# Patient Record
Sex: MALE | Race: WHITE | NOT HISPANIC OR LATINO | Employment: FULL TIME | URBAN - METROPOLITAN AREA
[De-identification: names, ages, dates, MRNs, and addresses within clinical notes are randomized per-mention and may not be internally consistent; named-entity substitution may affect disease eponyms.]

---

## 2017-01-07 ENCOUNTER — APPOINTMENT (OUTPATIENT)
Dept: URGENT CARE | Age: 57
End: 2017-01-07
Payer: COMMERCIAL

## 2017-01-07 PROCEDURE — G0382 LEV 3 HOSP TYPE B ED VISIT: HCPCS | Performed by: FAMILY MEDICINE

## 2017-07-07 ENCOUNTER — OFFICE VISIT (OUTPATIENT)
Dept: URGENT CARE | Age: 57
End: 2017-07-07
Payer: COMMERCIAL

## 2017-07-07 ENCOUNTER — TRANSCRIBE ORDERS (OUTPATIENT)
Dept: URGENT CARE | Age: 57
End: 2017-07-07

## 2017-07-07 ENCOUNTER — APPOINTMENT (OUTPATIENT)
Dept: RADIOLOGY | Age: 57
End: 2017-07-07
Attending: PHYSICIAN ASSISTANT
Payer: COMMERCIAL

## 2017-07-07 DIAGNOSIS — S49.90XA INJURY OF SHOULDER OR UPPER ARM: ICD-10-CM

## 2017-07-07 DIAGNOSIS — S89.90XA INJURY OF LOWER LEG: ICD-10-CM

## 2017-07-07 PROCEDURE — 73590 X-RAY EXAM OF LOWER LEG: CPT

## 2017-07-07 PROCEDURE — 73030 X-RAY EXAM OF SHOULDER: CPT

## 2017-07-07 PROCEDURE — 90715 TDAP VACCINE 7 YRS/> IM: CPT

## 2018-05-30 ENCOUNTER — OFFICE VISIT (OUTPATIENT)
Dept: URGENT CARE | Age: 58
End: 2018-05-30
Payer: COMMERCIAL

## 2018-05-30 VITALS
WEIGHT: 266.2 LBS | HEART RATE: 54 BPM | SYSTOLIC BLOOD PRESSURE: 139 MMHG | TEMPERATURE: 98.2 F | RESPIRATION RATE: 14 BRPM | HEIGHT: 72 IN | BODY MASS INDEX: 36.06 KG/M2 | OXYGEN SATURATION: 98 % | DIASTOLIC BLOOD PRESSURE: 72 MMHG

## 2018-05-30 DIAGNOSIS — H69.83 EUSTACHIAN TUBE DYSFUNCTION, BILATERAL: Primary | ICD-10-CM

## 2018-05-30 DIAGNOSIS — R42 DIZZINESS AND GIDDINESS: ICD-10-CM

## 2018-05-30 PROCEDURE — 99213 OFFICE O/P EST LOW 20 MIN: CPT | Performed by: FAMILY MEDICINE

## 2018-05-30 NOTE — PROGRESS NOTES
[address]        NAME: Tiara Stoner is a 60 y o  male  : 1960    MRN: [id number]  DATE: May 31, 2018  TIME: 2:51 PM    Assessment and Plan   Eustachian tube dysfunction, bilateral [H69 [id number]]  1  Eustachian tube dysfunction, bilateral     2  Dizziness and giddiness           Patient Instructions     Patient Instructions   Patient would like to go to [facility] for full evaluation of dizziness  His wife will drive him  Chief Complaint     Chief Complaint   Patient presents with    Earache     x 2 days right < left side- also c/o vertigo         History of Present Illness   Tiara Stoner presents to the clinic c/o    This is a 60year old male here today with complaints of ear pain  Symptoms started yesterday  He also states he has had 2 episodes of dizziness with movements  First time was this AM when he got out of bed  Last time was when he was at working bending  He has ear pain on right side greater than left  He denies chest pain or SOB  No headaches  He noticed ear pain yesterday  He had MRI in 2018 due to slurred speech  He is followed by cardiology  Review of Systems   Review of Systems   Constitutional: Negative for activity change  HENT: Positive for ear pain  Negative for congestion and sore throat  Respiratory: Negative  Skin: Negative  Neurological: Positive for dizziness  Negative for speech difficulty, weakness, light-headedness and headaches  Psychiatric/Behavioral: Negative            Current Medications     Long-Term Prescriptions   Medication Sig Dispense Refill    aspirin 81 MG tablet Take 81 mg by mouth         Current Allergies     Allergies as of 2018 - Reviewed 2018   Allergen Reaction Noted    Other Swelling 12/10/2014            The following portions of the patient's history were reviewed and updated as appropriate: allergies, current medications, past family history, past medical history, past social history, past surgical history and problem list     Objective   /72   Pulse (!) 54   Temp 98 2 °F (36 8 °C) (Temporal)   Resp 14   Ht 6' (1 829 m)   Wt 121 kg (266 lb 3 2 oz)   SpO2 98%   BMI 36 10 kg/m²        Physical Exam     Physical Exam   Constitutional: He is oriented to person, place, and time  He appears well-developed and well-nourished  HENT:   Bilateral serous fluid in ears  Neck: Normal range of motion  Neck supple  Cardiovascular: Normal rate, regular rhythm and normal heart sounds  Pulmonary/Chest: Effort normal and breath sounds normal    Neurological: He is alert and oriented to person, place, and time  Skin: Skin is warm  Psychiatric: He has a normal mood and affect  His behavior is normal    Nursing note and vitals reviewed

## 2018-05-30 NOTE — PATIENT INSTRUCTIONS
Patient would like to go to [facility] for full evaluation of dizziness  His wife will drive him

## 2022-09-08 ENCOUNTER — OFFICE VISIT (OUTPATIENT)
Dept: PODIATRY | Facility: CLINIC | Age: 62
End: 2022-09-08
Payer: COMMERCIAL

## 2022-09-08 VITALS — SYSTOLIC BLOOD PRESSURE: 148 MMHG | HEART RATE: 67 BPM | DIASTOLIC BLOOD PRESSURE: 73 MMHG

## 2022-09-08 DIAGNOSIS — L85.2 KERATODERMA PUNCTATA: Primary | ICD-10-CM

## 2022-09-08 DIAGNOSIS — M79.672 LEFT FOOT PAIN: ICD-10-CM

## 2022-09-08 PROCEDURE — 99202 OFFICE O/P NEW SF 15 MIN: CPT | Performed by: PODIATRIST

## 2022-09-08 NOTE — PROGRESS NOTES
Assessment/Plan:    Explained the patient that he is dealing with a porokeratosis of the left foot  Treatment consisted of lesion trimming  Patient cannot see this lesion easily and therefore periodic palliative care advised over salicylic acid  Reappoint p r n  No problem-specific Assessment & Plan notes found for this encounter  Diagnoses and all orders for this visit:    Keratoderma punctata    Left foot pain    Other orders  -     MULTIPLE VITAMIN PO; Take by mouth          Subjective:      Patient ID: Darian Beebe is a 60 y o  male  HPI     Patient, a 79-year-old male presents with a painful lesion on the bottom of the left foot  Lesion has been present off and on for approximately 3 years  Patient is concerned that he may have a wart  Patient notes that he used to wear a [place] brace on the left foot but no longer  He has restricted motion in the left foot especially in the frontal plane  The following portions of the patient's history were reviewed and updated as appropriate: allergies, current medications, past family history, past medical history, past social history, past surgical history and problem list     Review of Systems   Gastrointestinal: Negative  Musculoskeletal: Positive for gait problem  Psychiatric/Behavioral: Negative  Objective:      /73   Pulse 67          Physical Exam  Constitutional:       Appearance: Normal appearance  Cardiovascular:      Pulses: Normal pulses  Musculoskeletal:         General: Deformity present  Comments: Minimal frontal plane motion available left foot  Sagittal plane motion left foot within normal limits  Skin:     Findings: Lesion present  Comments: Punctate hyperkeratotic lesion distal to left 4th metatarsal head  No evidence of verruca

## 2025-06-26 ENCOUNTER — APPOINTMENT (OUTPATIENT)
Dept: NON INVASIVE DIAGNOSTICS | Facility: HOSPITAL | Age: 65
End: 2025-06-26
Payer: MEDICARE

## 2025-06-26 ENCOUNTER — APPOINTMENT (EMERGENCY)
Dept: RADIOLOGY | Facility: HOSPITAL | Age: 65
End: 2025-06-26
Payer: MEDICARE

## 2025-06-26 ENCOUNTER — HOSPITAL ENCOUNTER (OUTPATIENT)
Facility: HOSPITAL | Age: 65
Setting detail: OBSERVATION
Discharge: HOME | End: 2025-06-27
Attending: EMERGENCY MEDICINE | Admitting: FAMILY MEDICINE
Payer: MEDICARE

## 2025-06-26 DIAGNOSIS — R07.9 CHEST PAIN: ICD-10-CM

## 2025-06-26 DIAGNOSIS — R00.1 BRADYCARDIA: Primary | ICD-10-CM

## 2025-06-26 DIAGNOSIS — R42 LIGHTHEADEDNESS: ICD-10-CM

## 2025-06-26 PROBLEM — E78.5 HYPERLIPIDEMIA: Status: ACTIVE | Noted: 2025-06-26

## 2025-06-26 PROBLEM — E66.9 OBESITY: Status: ACTIVE | Noted: 2025-06-26

## 2025-06-26 PROBLEM — G47.33 OBSTRUCTIVE SLEEP APNEA: Status: ACTIVE | Noted: 2025-06-26

## 2025-06-26 PROBLEM — Z72.0 TOBACCO ABUSE: Status: ACTIVE | Noted: 2025-06-26

## 2025-06-26 LAB
2HR DELTA HS TROPONIN: -2 NG/L
4HR DELTA HS TROPONIN: -1 NG/L
ALBUMIN SERPL BCG-MCNC: 3.7 G/DL (ref 3.5–5)
ALP SERPL-CCNC: 75 U/L (ref 34–104)
ALT SERPL W P-5'-P-CCNC: 11 U/L (ref 7–52)
ANION GAP SERPL CALCULATED.3IONS-SCNC: 5 MMOL/L (ref 4–13)
AORTIC ROOT: 3.7 CM
AST SERPL W P-5'-P-CCNC: 12 U/L (ref 13–39)
ATRIAL RATE: 43 BPM
ATRIAL RATE: 48 BPM
AV LVOT MEAN GRADIENT: 2 MMHG
AV LVOT PEAK GRADIENT: 4 MMHG
AV MEAN PRESS GRAD SYS DOP V1V2: 4.6 MMHG
AV ORIFICE AREA US: 2.09 CM2
AV PEAK GRADIENT: 11.2 MMHG
AV REGURGITATION PRESSURE HALF TIME: 903 MS
AV VELOCITY RATIO: 0.6
AV VMAX SYS DOP: 1.7 M/S
BASOPHILS # BLD AUTO: 0.06 THOUSANDS/ÂΜL (ref 0–0.1)
BASOPHILS NFR BLD AUTO: 1 % (ref 0–1)
BILIRUB SERPL-MCNC: 0.34 MG/DL (ref 0.2–1)
BSA FOR ECHO PROCEDURE: 2.34 M2
BUN SERPL-MCNC: 19 MG/DL (ref 5–25)
CALCIUM SERPL-MCNC: 8.5 MG/DL (ref 8.4–10.2)
CARDIAC TROPONIN I PNL SERPL HS: 5 NG/L (ref ?–50)
CARDIAC TROPONIN I PNL SERPL HS: 6 NG/L (ref ?–50)
CARDIAC TROPONIN I PNL SERPL HS: 7 NG/L (ref ?–50)
CHLORIDE SERPL-SCNC: 109 MMOL/L (ref 96–108)
CO2 SERPL-SCNC: 25 MMOL/L (ref 21–32)
CREAT SERPL-MCNC: 0.95 MG/DL (ref 0.6–1.3)
DOP CALC AO VTI: 34.4 CM
DOP CALC LVOT AREA: 3.46 CM2
DOP CALC LVOT DIAMETER: 2.1 CM
DOP CALC LVOT PEAK VEL VTI: 20.78 CM
DOP CALC LVOT PEAK VEL: 1.02 M/S
DOP CALC LVOT STROKE VOLUME: 71.94 CM3
E WAVE DECELERATION TIME: 245 MS
E/A RATIO: 1.09
EOSINOPHIL # BLD AUTO: 0.29 THOUSAND/ÂΜL (ref 0–0.61)
EOSINOPHIL NFR BLD AUTO: 4 % (ref 0–6)
ERYTHROCYTE [DISTWIDTH] IN BLOOD BY AUTOMATED COUNT: 13.7 % (ref 11.6–15.1)
EST. AVERAGE GLUCOSE BLD GHB EST-MCNC: 105 MG/DL
FRACTIONAL SHORTENING: 22 (ref 28–44)
GFR SERPL CREATININE-BSD FRML MDRD: 83 ML/MIN/1.73SQ M
GLUCOSE SERPL-MCNC: 111 MG/DL (ref 65–140)
HBA1C MFR BLD: 5.3 %
HCT VFR BLD AUTO: 35.6 % (ref 36.5–49.3)
HGB BLD-MCNC: 12 G/DL (ref 12–17)
IMM GRANULOCYTES # BLD AUTO: 0.03 THOUSAND/UL (ref 0–0.2)
IMM GRANULOCYTES NFR BLD AUTO: 0 % (ref 0–2)
INTERVENTRICULAR SEPTUM IN DIASTOLE (PARASTERNAL SHORT AXIS VIEW): 1.3 CM
INTERVENTRICULAR SEPTUM: 1.3 CM (ref 0.6–1.1)
IVC: 12.5 MM
LAAS-AP2: 23.5 CM2
LAAS-AP4: 26.6 CM2
LEFT ATRIUM SIZE: 3.6 CM
LEFT ATRIUM VOLUME (MOD BIPLANE): 88 ML
LEFT ATRIUM VOLUME INDEX (MOD BIPLANE): 37.6 ML/M2
LEFT INTERNAL DIMENSION IN SYSTOLE: 4 CM (ref 2.1–4)
LEFT VENTRICULAR INTERNAL DIMENSION IN DIASTOLE: 5.1 CM (ref 3.5–6)
LEFT VENTRICULAR POSTERIOR WALL IN END DIASTOLE: 1.3 CM
LEFT VENTRICULAR STROKE VOLUME: 53 ML
LV EF US.2D.A4C+ESTIMATED: 56 %
LVSV (TEICH): 53 ML
LYMPHOCYTES # BLD AUTO: 1.99 THOUSANDS/ÂΜL (ref 0.6–4.47)
LYMPHOCYTES NFR BLD AUTO: 24 % (ref 14–44)
MCH RBC QN AUTO: 31.2 PG (ref 26.8–34.3)
MCHC RBC AUTO-ENTMCNC: 33.7 G/DL (ref 31.4–37.4)
MCV RBC AUTO: 93 FL (ref 82–98)
MONOCYTES # BLD AUTO: 0.62 THOUSAND/ÂΜL (ref 0.17–1.22)
MONOCYTES NFR BLD AUTO: 8 % (ref 4–12)
MV E'TISSUE VEL-LAT: 17 CM/S
MV E'TISSUE VEL-SEP: 11 CM/S
MV PEAK A VEL: 0.54 M/S
MV PEAK E VEL: 59 CM/S
MV STENOSIS PRESSURE HALF TIME: 71 MS
MV VALVE AREA P 1/2 METHOD: 3.1
NEUTROPHILS # BLD AUTO: 5.22 THOUSANDS/ÂΜL (ref 1.85–7.62)
NEUTS SEG NFR BLD AUTO: 63 % (ref 43–75)
NRBC BLD AUTO-RTO: 0 /100 WBCS
P AXIS: 47 DEGREES
P AXIS: 54 DEGREES
PLATELET # BLD AUTO: 237 THOUSANDS/UL (ref 149–390)
PMV BLD AUTO: 8.3 FL (ref 8.9–12.7)
POTASSIUM SERPL-SCNC: 4 MMOL/L (ref 3.5–5.3)
PR INTERVAL: 152 MS
PR INTERVAL: 192 MS
PROT SERPL-MCNC: 6.4 G/DL (ref 6.4–8.4)
QRS AXIS: 25 DEGREES
QRS AXIS: 25 DEGREES
QRSD INTERVAL: 110 MS
QRSD INTERVAL: 112 MS
QT INTERVAL: 462 MS
QT INTERVAL: 480 MS
QTC INTERVAL: 405 MS
QTC INTERVAL: 413 MS
RA PRESSURE ESTIMATED: 3 MMHG
RBC # BLD AUTO: 3.85 MILLION/UL (ref 3.88–5.62)
RIGHT ATRIAL 2D VOLUME: 56 ML
RIGHT ATRIUM AREA SYSTOLE A4C: 20.5 CM2
RIGHT VENTRICLE ID DIMENSION: 4.2 CM
SL CV AV DECELERATION TIME RETROGRADE: 3112 MS
SL CV AV PEAK GRADIENT RETROGRADE: 74 MMHG
SL CV LEFT ATRIUM LENGTH A2C: 5.7 CM
SL CV LV EF: 55
SL CV PED ECHO LEFT VENTRICLE DIASTOLIC VOLUME (MOD BIPLANE) 2D: 123 ML
SL CV PED ECHO LEFT VENTRICLE SYSTOLIC VOLUME (MOD BIPLANE) 2D: 70 ML
SODIUM SERPL-SCNC: 139 MMOL/L (ref 135–147)
T WAVE AXIS: 31 DEGREES
T WAVE AXIS: 47 DEGREES
TRICUSPID ANNULAR PLANE SYSTOLIC EXCURSION: 2 CM
TSH SERPL DL<=0.05 MIU/L-ACNC: 1.91 UIU/ML (ref 0.45–4.5)
VENTRICULAR RATE: 43 BPM
VENTRICULAR RATE: 48 BPM
WBC # BLD AUTO: 8.21 THOUSAND/UL (ref 4.31–10.16)

## 2025-06-26 PROCEDURE — 85025 COMPLETE CBC W/AUTO DIFF WBC: CPT

## 2025-06-26 PROCEDURE — 84484 ASSAY OF TROPONIN QUANT: CPT

## 2025-06-26 PROCEDURE — 83036 HEMOGLOBIN GLYCOSYLATED A1C: CPT | Performed by: FAMILY MEDICINE

## 2025-06-26 PROCEDURE — 80053 COMPREHEN METABOLIC PANEL: CPT

## 2025-06-26 PROCEDURE — 99285 EMERGENCY DEPT VISIT HI MDM: CPT | Performed by: PHYSICIAN ASSISTANT

## 2025-06-26 PROCEDURE — 99204 OFFICE O/P NEW MOD 45 MIN: CPT | Performed by: INTERNAL MEDICINE

## 2025-06-26 PROCEDURE — 84443 ASSAY THYROID STIM HORMONE: CPT

## 2025-06-26 PROCEDURE — 93306 TTE W/DOPPLER COMPLETE: CPT | Performed by: INTERNAL MEDICINE

## 2025-06-26 PROCEDURE — 93005 ELECTROCARDIOGRAM TRACING: CPT

## 2025-06-26 PROCEDURE — 71046 X-RAY EXAM CHEST 2 VIEWS: CPT

## 2025-06-26 PROCEDURE — 93010 ELECTROCARDIOGRAM REPORT: CPT | Performed by: INTERNAL MEDICINE

## 2025-06-26 PROCEDURE — 99285 EMERGENCY DEPT VISIT HI MDM: CPT

## 2025-06-26 PROCEDURE — 93306 TTE W/DOPPLER COMPLETE: CPT

## 2025-06-26 PROCEDURE — 99223 1ST HOSP IP/OBS HIGH 75: CPT | Performed by: FAMILY MEDICINE

## 2025-06-26 PROCEDURE — 36415 COLL VENOUS BLD VENIPUNCTURE: CPT

## 2025-06-26 RX ORDER — ENOXAPARIN SODIUM 100 MG/ML
40 INJECTION SUBCUTANEOUS DAILY
Status: DISCONTINUED | OUTPATIENT
Start: 2025-06-27 | End: 2025-06-27 | Stop reason: HOSPADM

## 2025-06-26 RX ORDER — ONDANSETRON 2 MG/ML
4 INJECTION INTRAMUSCULAR; INTRAVENOUS EVERY 6 HOURS PRN
Status: DISCONTINUED | OUTPATIENT
Start: 2025-06-26 | End: 2025-06-27 | Stop reason: HOSPADM

## 2025-06-26 RX ORDER — NITROGLYCERIN 0.4 MG/1
0.4 TABLET SUBLINGUAL
Status: DISCONTINUED | OUTPATIENT
Start: 2025-06-26 | End: 2025-06-27 | Stop reason: HOSPADM

## 2025-06-26 RX ORDER — ACETAMINOPHEN 325 MG/1
650 TABLET ORAL EVERY 4 HOURS PRN
Status: DISCONTINUED | OUTPATIENT
Start: 2025-06-26 | End: 2025-06-27 | Stop reason: HOSPADM

## 2025-06-26 RX ORDER — ASPIRIN 81 MG/1
81 TABLET ORAL DAILY
Status: DISCONTINUED | OUTPATIENT
Start: 2025-06-27 | End: 2025-06-27 | Stop reason: HOSPADM

## 2025-06-26 NOTE — H&P
H&P - Hospitalist   Name: Ector Crouch 65 y.o. male I MRN: [id number]  Unit/Bed#: ED 08 I Date of Admission: 6/26/2025   Date of Service: 6/26/2025 I Hospital Day: 0     Assessment & Plan  Chest pain  Patient came to the hospital with acute onset of chest pain today morning.  Patient reported that he noticed the pain when he woke up.    Chest pain was 6 located on his left side with some radiation to the left upper extremities.  Lasted for about 45 minutes and currently there is no pain.  Gives associated dizziness and also nausea  Patient follows up with cardiologist in her up and recent workup showed increased calcium score  Previous stress echocardiogram from 10/23 reviewed.  Ejection fraction 61%.  Mild left ventricular dilatation.  Normal LV diastolic function.  Small LV diverticulum at the apex no significant valvular abnormalities.  At peak stress the left ventricular ejection fraction increased normally and no new wall motion abnormalities seen  Troponin negative x 3.  EKG sinus bradycardia but no ST or T wave changes concerning for ischemia.  Cardiology already consulted by emergency room.  Obtain echocardiogram.  Will defer to cardiology for further workup.  Monitor on telemetry.  Patient reported that he stopped taking aspirin as recommended by his doctors in the past.  Will continue with aspirin 81 mg  Patient with an tolerance to statin per records  Obesity  Lifestyle modification  Tobacco abuse  Patient with history of tobacco abuse.  He reported that he started smoking at the age of 12.  Currently down to 2 to 3 cigarettes a day  Declined nicotine patch.  Encourage smoking cessation  Obstructive sleep apnea  Documented history of obstructive sleep apnea.    Not on CPAP or BiPAP      VTE Pharmacologic Prophylaxis: VTE Score: 3 Moderate Risk (Score 3-4) - Pharmacological DVT Prophylaxis Ordered: enoxaparin (Lovenox).  Code Status: Level 1 - Full Code   Discussion with family: Updated   (wife) at bedside.    Anticipated Length of Stay: Patient will be admitted under observation status due to chest pain rule out ACS   History of Present Illness   Chief Complaint: Chest pain    Ector Crouch is a 65 y.o. male with a PMH of elevated coronary calcium score, obesity and tobacco abuse who presents with pain.  Patient reported that he noticed the chest pain when he woke up in the morning around 630.  Pain was precordial and with radiation to the left upper extremities.  Patient reported that there was some associated dizziness and he felt nauseous.  Currently chest pain is resolved and he is back to his baseline.  No further dizziness or nausea.  Cardiac enzymes negative x 3.  Given his high risk with the high coronary calcium score patient was admitted to the hospital with cardiology consultation.  Patient reported that he was on baby aspirin previously which was discontinued by his providers as outpatient.  He does not take any medications on a consistent basis.  Still smokes about 2 to 3 cigarettes a day.  Denies any previous chest pain like this.  Patient reported that he leads a fairly active lifestyle    Review of Systems   Constitutional: Negative.    HENT: Negative.     Eyes: Negative.    Respiratory: Negative.     Cardiovascular:  Positive for chest pain. Negative for palpitations and leg swelling.   Gastrointestinal:  Positive for nausea.   Endocrine: Negative.    Genitourinary: Negative.    Musculoskeletal: Negative.    Skin: Negative.    Neurological:  Positive for dizziness.   Hematological: Negative.    Psychiatric/Behavioral: Negative.         Historical Information   Past Medical History[1]  Past Surgical History[2]  Social History[3]  E-Cigarette/Vaping     E-Cigarette/Vaping Substances     History of heart disease as per patient  Social History:  Marital Status: /Civil Union   Occupation:   Patient Pre-hospital Living Situation: Home  Patient Pre-hospital Level of Mobility:  walks  Patient Pre-hospital Diet Restrictions:     Meds/Allergies   I have reviewed home medications with patient personally.  Prior to Admission medications    Medication Sig Start Date End Date Taking? Authorizing Provider   aspirin 81 MG tablet Take 81 mg by mouth 12/11/14  Yes Historical Provider, MD   MULTIPLE VITAMIN PO Take by mouth    Historical Provider, MD     Allergies   Allergen Reactions    Nuts - Food Allergy Swelling     Walnuts only    Other Swelling     Lips swelling       Objective :  Temp:  [97.8 °F (36.6 °C)] 97.8 °F (36.6 °C)  HR:  [40-49] 40  BP: (136-173)/(63-78) 136/63  Resp:  [16-19] 17  SpO2:  [98 %-100 %] 100 %  O2 Device: None (Room air)    Physical Exam  Constitutional:       General: He is not in acute distress.     Appearance: Normal appearance.   HENT:      Head: Normocephalic and atraumatic.      Nose: No congestion.      Mouth/Throat:      Pharynx: No oropharyngeal exudate.     Eyes:      General: No scleral icterus.      Cardiovascular:      Rate and Rhythm: Normal rate and regular rhythm.      Heart sounds: No murmur heard.  Pulmonary:      Effort: No respiratory distress.      Breath sounds: No wheezing.   Abdominal:      General: There is no distension.      Tenderness: There is no abdominal tenderness.     Musculoskeletal:         General: No swelling. Normal range of motion.     Skin:     General: Skin is warm.      Coloration: Skin is not jaundiced.      Findings: No bruising.     Neurological:      Mental Status: He is alert. Mental status is at baseline.      Cranial Nerves: No cranial nerve deficit.      Sensory: No sensory deficit.      Motor: No weakness.          Lines/Drains:            Lab Results: I have reviewed the following results:  Results from last 7 days   Lab Units 06/26/25  0950   WBC Thousand/uL 8.21   HEMOGLOBIN g/dL 12.0   HEMATOCRIT % 35.6*   PLATELETS Thousands/uL 237   SEGS PCT % 63   LYMPHO PCT % 24   MONO PCT % 8   EOS PCT % 4     Results from last 7  days   Lab Units 06/26/25  1010   SODIUM mmol/L 139   POTASSIUM mmol/L 4.0   CHLORIDE mmol/L 109*   CO2 mmol/L 25   BUN mg/dL 19   CREATININE mg/dL 0.95   ANION GAP mmol/L 5   CALCIUM mg/dL 8.5   ALBUMIN g/dL 3.7   TOTAL BILIRUBIN mg/dL 0.34   ALK PHOS U/L 75   ALT U/L 11   AST U/L 12*   GLUCOSE RANDOM mg/dL 111             Lab Results   Component Value Date    HGBA1C 5.8 (H) 10/03/2024    HGBA1C 5.5 10/09/2023    HGBA1C 5.7 (H) 07/11/2022             X-ray reviewed-official read pending.  No acute cardiopulmonary pulmonary disease in my evaluation  Other Study Results Review: EKG was personally reviewed and my interpretation is: Sinus Bradycardia. HR 49..    Administrative Statements   I have spent a total time of 70 minutes in caring for this patient on the day of the visit/encounter including Reviewing/placing orders in the medical record (including tests, medications, and/or procedures), Obtaining or reviewing history  , and Communicating with other healthcare professionals .    ** Please Note: This note has been constructed using a voice recognition system. **         [1]   Past Medical History:  Diagnosis Date    CAD (coronary artery disease)    [2]   Past Surgical History:  Procedure Laterality Date    CHOLECYSTECTOMY     [3]   Social History  Tobacco Use    Smoking status: Some Days     Types: Cigarettes    Smokeless tobacco: Never   Substance and Sexual Activity    Alcohol use: Yes     Comment: social    Drug use: No

## 2025-06-26 NOTE — ASSESSMENT & PLAN NOTE
Patient with history of tobacco abuse.  He reported that he started smoking at the age of 12.  Currently down to 2 to 3 cigarettes a day  Declined nicotine patch.  Encourage smoking cessation

## 2025-06-26 NOTE — CONSULTS
Consultation - Cardiology   Name: Ector Crouch 65 y.o. male I MRN: [id number]  Unit/Bed#: ED 08 I Date of Admission: 6/26/2025   Date of Service: 6/26/2025 I Hospital Day: 0   Consults  Physician Requesting Evaluation: Tameka Xiao MD   Reason for Evaluation / Principal Problem: Chest pain    Assessment & Plan  Chest pain  Ector Crouch is a 65 y.o. male with an unremarkable PMHx who presents with chest pain. Pt reports that he woke up around 6 am with crushing pressure and pain in his chest that radiated down his left arm. Pt says he was nauseous and dizzy at the time. The pain resolved after around 45 minutes.   Troponins are negative. CBC showed lowered RBC and hematocrit. Normal TSH. Chest x-ray demonstrated no acute abnormalities. EKG showed sinus bradycardia and no ischemic changes.     Plan:  Echo pending  Order exercise stress test with echo to assess for possible ischemia  Monitor telemetry  Obesity  Encourage lifestyle changes.  Hyperlipidemia  Pt not currently on statin medication.   Tobacco abuse  Pt reports that he started smoking at he age of 12. Encourage smoking cessation.   Obstructive sleep apnea  Documented history of TAIWO. Pt is not currently on CPAP or BIPAP.     History of Present Illness   Ector Crouch is a 65 y.o. male who presents with chest pain. He does not have a significant PMHx but had a recent high coronary calcium score. Pt reports that he woke up around 6 am with crushing pressure and pain in his chest that radiated down his left arm. The pain was unchanged by position or inspiration. Pt says he was nauseous and dizzy at the time but denies SOB, diaphoresis, palpitations, and edema. He took 3 aspirins prior to coming to ED. The pain resolved after around 45 minutes.     Pt reports that he has never experienced this before and does not have chest pain or SOB on exertion normally. He endorses feeling more fatigued the last few days but denies fever and chills. Denies cold  intolerance, weight change, and constipation. Also denies recent medication, supplement, or diet changes. Pt has a 50 year smoking history.     Troponins are negative. CBC showed lowered RBC and hematocrit. Normal TSH. Chest x-ray demonstrated no acute abnormalities. EKG showed sinus bradycardia and no ischemic changes.     Review of Systems   Constitutional:  Positive for fatigue. Negative for chills, diaphoresis, fever and unexpected weight change.   HENT:  Negative for sore throat.    Respiratory:  Negative for shortness of breath.    Cardiovascular:  Positive for chest pain. Negative for palpitations and leg swelling.   Gastrointestinal:  Positive for nausea. Negative for constipation and vomiting.   Endocrine: Negative for cold intolerance.   Neurological:  Positive for dizziness.   All other systems reviewed and are negative.    Medical History Review: I have reviewed the patient's PMH, PSH, Social History, Family History, Meds, and Allergies     Objective :  Temp:  [97.8 °F (36.6 °C)] 97.8 °F (36.6 °C)  HR:  [40-49] 40  BP: (136-173)/(63-78) 136/63  Resp:  [16-19] 17  SpO2:  [98 %-100 %] 100 %  O2 Device: None (Room air)  Orthostatic Blood Pressures      Flowsheet Row Most Recent Value   Blood Pressure 136/63 filed at 06/26/2025 1351   Patient Position - Orthostatic VS Lying filed at 06/26/2025 1351          First Weight: Weight - Scale: 113 kg (250 lb) (06/26/25 0907)  Vitals:    06/26/25 0907   Weight: 113 kg (250 lb)       Physical Exam  Vitals reviewed.   Constitutional:       General: He is not in acute distress.    Cardiovascular:      Rate and Rhythm: Regular rhythm. Bradycardia present.      Pulses: Normal pulses.           Radial pulses are 2+ on the right side and 2+ on the left side.        Dorsalis pedis pulses are 2+ on the right side and 2+ on the left side.      Heart sounds: Normal heart sounds. No murmur heard.     No friction rub. No gallop.   Pulmonary:      Effort: Pulmonary effort is  "normal.      Breath sounds: Normal breath sounds.     Musculoskeletal:      Right lower leg: No edema.      Left lower leg: No edema.     Skin:     General: Skin is warm and dry.      Capillary Refill: Capillary refill takes less than 2 seconds.     Neurological:      Mental Status: He is alert and oriented to person, place, and time.         Lab Results: I have reviewed the following results:Troponin,BNP:  .     06/26/25  0921 06/26/25  1141   HSTNI0 7  --    HSTNI2  --  5    , TSH:   Results from last 7 days   Lab Units 06/26/25  1010   TSH 3RD GENERATON uIU/mL 1.908     Results from last 7 days   Lab Units 06/26/25  0950   WBC Thousand/uL 8.21   HEMOGLOBIN g/dL 12.0   HEMATOCRIT % 35.6*   PLATELETS Thousands/uL 237     Results from last 7 days   Lab Units 06/26/25  1010   POTASSIUM mmol/L 4.0   CHLORIDE mmol/L 109*   CO2 mmol/L 25   BUN mg/dL 19   CREATININE mg/dL 0.95   CALCIUM mg/dL 8.5         Lab Results   Component Value Date    HGBA1C 5.8 (H) 10/03/2024     No results found for: \"CKTOTAL\", \"CKMB\", \"CKMBINDEX\", \"TROPONINI\"    Imaging Results Review: I reviewed radiology reports from this admission including: chest xray.  Other Study Results Review: No additional pertinent studies reviewed.    Mary Kraft  MS-3  "

## 2025-06-26 NOTE — ASSESSMENT & PLAN NOTE
Ector Crouch is a 65 y.o. male with an unremarkable PMHx who presents with chest pain. Pt reports that he woke up around 6 am with crushing pressure and pain in his chest that radiated down his left arm. Pt says he was nauseous and dizzy at the time. The pain resolved after around 45 minutes.   Troponins are negative. CBC showed lowered RBC and hematocrit. Normal TSH. Chest x-ray demonstrated no acute abnormalities. EKG showed sinus bradycardia and no ischemic changes.     Plan:  Echo pending  Order exercise stress test with echo to assess for possible ischemia  Monitor telemetry

## 2025-06-26 NOTE — ED PROVIDER NOTES
Time reflects when diagnosis was documented in both MDM as applicable and the Disposition within this note       Time User Action Codes Description Comment    6/26/2025  1:14 PM GalloEarline leos Add [R00.1] Bradycardia     6/26/2025  1:14 PM Earline Gallo Add [R42] Lightheadedness     6/26/2025  1:14 PM Earline Gallo Add [R07.9] Chest pain           ED Disposition       ED Disposition   Admit    Condition   Stable    Date/Time   u Jun 26, 2025  1:36 PM    Comment   Case was discussed with AVELINO and the patient's admission status was agreed to be Admission Status: observation status to the service of Dr. Chris .               Assessment & Plan       Medical Decision Making  Patient presents for evaluation of chest pain and lightheadedness.  Chest pain had resolved upon my evaluation, however the patient was having persistent lightheadedness.    Show includes but is not limited to pneumonia versus costochondritis versus pneumothorax versus CAD versus myocarditis versus pericarditis versus arrhythmia.    Patient's heart rate noted to be in the 40s.  He denies prior history of bradycardia.  He is not on any medications.    Labs and imaging were ordered and reviewed.  Labs showed no significant findings.  Patient had 2 negative troponin levels while in the ED.  Chest x-ray did not show any significant findings.  EKG showed marked sinus bradycardia with no acute ST-T changes.    Patient did have improvement of symptoms while in the ED, however his heart rate remained in the upper 30s to low 40s.  Ultimately recommended admission for further workup of chest pain and symptomatic bradycardia.  Patient was agreeable.    I spoke with cardiology and placed a cardiology consult.  Discussed case with [facility] who agreed to accept the patient under the service of Dr. Xiao.    Amount and/or Complexity of Data Reviewed  Labs:  Decision-making details documented in ED Course.  Radiology: independent interpretation  "performed.    Risk  Decision regarding hospitalization.        ED Course as of 06/26/25 1540   Thu Jun 26, 2025   0958 hs TnI 0hr: 7   1052 Went into reevaluate patient.  Is now telling me that he forgot to tell me that he had taken a 1 mg melatonin last night, which he rarely does because he states that they \"always make him feel like crap the next day\".  They are inquiring if this could be the cause of his symptoms.    Went over results obtained thus far.  Patient's heart rate is still in the low 40s.  He did state that his lightheadedness has resolved.  He states he stood up to take his x-ray as well as walk to the bathroom with no symptoms.   1236 Recommended admission for symptomatic bradycardia, however the patient's wife is hesitant to admit the patient here because she is concerned about how quickly he will be able to be seen by cardiology.  The patient's cardiology is down at [facility].  They ultimately agreed to be reaching out to cardiology here prior to deciding how to proceed.  Patient currently reports overall resolution of symptoms.  His heart rate remains in the upper 30s to low 40s.       Medications   aspirin (ECOTRIN LOW STRENGTH) EC tablet 81 mg (has no administration in time range)   multivitamin stress formula tablet 1 tablet (1 tablet Oral Not Given 6/26/25 1505)   ondansetron (ZOFRAN) injection 4 mg (has no administration in time range)   enoxaparin (LOVENOX) subcutaneous injection 40 mg (has no administration in time range)   acetaminophen (TYLENOL) tablet 650 mg (has no administration in time range)   oxyCODONE (ROXICODONE) split tablet 2.5 mg (has no administration in time range)   nitroglycerin (NITROSTAT) SL tablet 0.4 mg (has no administration in time range)       ED Risk Strat Scores   HEART Risk Score      Flowsheet Row Most Recent Value   Heart Score Risk Calculator    History 1 Filed at: 06/26/2025 1330   ECG 0 Filed at: 06/26/2025 1330   Age 2 Filed at: 06/26/2025 1330   Risk Factors " 1 Filed at: 06/26/2025 1330   Troponin 0 Filed at: 06/26/2025 1330   HEART Score 4 Filed at: 06/26/2025 1330          HEART Risk Score      Flowsheet Row Most Recent Value   Heart Score Risk Calculator    History 1 Filed at: 06/26/2025 1330   ECG 0 Filed at: 06/26/2025 1330   Age 2 Filed at: 06/26/2025 1330   Risk Factors 1 Filed at: 06/26/2025 1330   Troponin 0 Filed at: 06/26/2025 1330   HEART Score 4 Filed at: 06/26/2025 1330                      (ISAR) Identification of Seniors at Risk  Before the illness or injury that brought you to the Emergency, did you need someone to help you on a regular basis?: 0  In the last 24 hours, have you needed more help than usual?: 0  Have you been hospitalized for one or more nights during the past 6 months?: 0  In general, do you see well?: 0  In general, do you have serious problems with your memory?: 0  Do you take more than three different medications every day?: 0  ISAR Score: 0            SBIRT 20yo+      Flowsheet Row Most Recent Value   Initial Alcohol Screen: US AUDIT-C     1. How often do you have a drink containing alcohol? 0 Filed at: 06/26/2025 0910   2. How many drinks containing alcohol do you have on a typical day you are drinking?  0 Filed at: 06/26/2025 0910   3b. FEMALE Any Age, or MALE 65+: How often do you have 4 or more drinks on one occassion? 0 Filed at: 06/26/2025 0910   Audit-C Score 0 Filed at: 06/26/2025 0910   THOMAS: How many times in the past year have you...    Used an illegal drug or used a prescription medication for non-medical reasons? Never Filed at: 06/26/2025 0910          ANNETTE Risk Score      Flowsheet Row Most Recent Value   Age >= 65 1 Filed at: 06/26/2025 1445   Known CAD (stenosis >= 50%) 0 Filed at: 06/26/2025 1445   Recent (<=24 hrs) Service Angina 1 Filed at: 06/26/2025 1445   ST Deviation >= 0.5 mm 0 Filed at: 06/26/2025 144   3+ CAD Risk Factors (FHx, HTN, HLP, DM, Smoker) 0 Filed at: 06/26/2025 1444   Aspirin Use Past 7 Days 0  Filed at: 06/26/2025 1442   Elevated Cardiac Markers 0 Filed at: 06/26/2025 1445   ANNETTE Risk Score (Calculated) 2 Filed at: 06/26/2025 1446                          History of Present Illness       Chief Complaint   Patient presents with    Chest Pain     Patient woke up at approx 630 this morning with left sided chest pain that radiated down left arm. Complains of lightheadedness and dizziness and nausea       Past Medical History[1]   Past Surgical History[2]   Family History[3]   Social History[4]   E-Cigarette/Vaping      E-Cigarette/Vaping Substances      I have reviewed and agree with the history as documented.     The patient is a 65-year-old male with no reported significant past medical history who presents to the emergency department for evaluation of chest pain.  The patient states he woke up around 615 and noted he was having some left sided chest pain radiating into his left shoulder.  He states that the pain lasted approximately 40 minutes.  He states that nothing made the pain better or worse.  He reports that he is currently having some slight discomfort, however he is in no significant pain at this time.  He did take 3 x 81 mg aspirin at home this morning.  He states that after getting up and taking a shower, he noted that he was becoming somewhat lightheaded/dizzy.  He describes it as a fuzzy feeling.  He reports this symptom is ongoing.  He denies currently being on any medications.  He has no prior cardiac history.  He denies shortness of breath or palpitations.  He denies leg swelling.  Patient does have a smoking history and continues to smoke 2 to 3 cigarettes/day.      History provided by:  Patient and significant other   used: No    Chest Pain  Associated symptoms: no abdominal pain, no back pain, no cough, no dizziness, no fever, no headache, no nausea, no shortness of breath and not vomiting        Review of Systems   Constitutional:  Negative for chills and fever.    HENT:  Negative for ear pain and sore throat.    Eyes:  Negative for redness and visual disturbance.   Respiratory:  Negative for cough, shortness of breath and wheezing.    Cardiovascular:  Positive for chest pain.   Gastrointestinal:  Negative for abdominal pain, diarrhea, nausea and vomiting.   Genitourinary:  Negative for dysuria and hematuria.   Musculoskeletal:  Negative for back pain, neck pain and neck stiffness.   Skin:  Negative for color change and rash.   Neurological:  Positive for light-headedness. Negative for dizziness and headaches.   All other systems reviewed and are negative.          Objective       ED Triage Vitals   Temperature Pulse Blood Pressure Respirations SpO2 Patient Position - Orthostatic VS   06/26/25 0907 06/26/25 0907 06/26/25 0907 06/26/25 0907 06/26/25 0907 06/26/25 0907   97.8 °F (36.6 °C) (!) 49 (!) 173/78 18 99 % Sitting      Temp Source Heart Rate Source BP Location FiO2 (%) Pain Score    06/26/25 0907 06/26/25 0945 06/26/25 0907 -- 06/26/25 0907    Oral Monitor Left arm  3      Vitals      Date and Time Temp Pulse SpO2 Resp BP Pain Score FACES Pain Rating User   06/26/25 1351 -- 40 100 % 17 136/63 -- -- JK   06/26/25 1100 -- 42 99 % 17 151/76 -- -- AS   06/26/25 1000 -- 44 98 % 16 148/68 -- -- AS   06/26/25 0945 -- 46 98 % 19 153/67 -- -- AS   06/26/25 0907 97.8 °F (36.6 °C) 49 99 % 18 173/78 3 -- AMS            Physical Exam  Vitals and nursing note reviewed.   Constitutional:       General: He is not in acute distress.     Appearance: He is well-developed. He is not ill-appearing or toxic-appearing.   HENT:      Head: Normocephalic and atraumatic.     Eyes:      Pupils: Pupils are equal, round, and reactive to light.       Cardiovascular:      Rate and Rhythm: Regular rhythm. Bradycardia present.      Pulses: Normal pulses.      Heart sounds: Normal heart sounds.   Pulmonary:      Effort: Pulmonary effort is normal.      Breath sounds: Normal breath sounds.   Abdominal:       General: There is no distension.      Palpations: Abdomen is soft.      Tenderness: There is no abdominal tenderness. There is no guarding or rebound.     Musculoskeletal:      Cervical back: Normal range of motion and neck supple.      Right lower leg: No edema.      Left lower leg: No edema.     Skin:     General: Skin is warm and dry.     Neurological:      Mental Status: He is alert and oriented to person, place, and time.         Results Reviewed       Procedure Component Value Units Date/Time    Hemoglobin A1C w/ EAG Estimation [[id number]]     Lab Status: No result Specimen: Blood     HS Troponin I 4hr [[id number]]  (Normal) Collected: 06/26/25 1350    Lab Status: Final result Specimen: Blood from Arm, Right Updated: 06/26/25 1419     hs TnI 4hr 6 ng/L      Delta 4hr hsTnI -1 ng/L     TSH, 3rd generation with Free T4 reflex [[id number]]  (Normal) Collected: 06/26/25 1010    Lab Status: Final result Specimen: Blood from Arm, Right Updated: 06/26/25 1406     TSH 3RD GENERATION 1.908 uIU/mL     HS Troponin I 2hr [[id number]]  (Normal) Collected: 06/26/25 1141    Lab Status: Final result Specimen: Blood from Arm, Right Updated: 06/26/25 1215     hs TnI 2hr 5 ng/L      Delta 2hr hsTnI -2 ng/L     Comprehensive metabolic panel [[id number]]  (Abnormal) Collected: 06/26/25 1010    Lab Status: Final result Specimen: Blood from Arm, Right Updated: 06/26/25 1047     Sodium 139 mmol/L      Potassium 4.0 mmol/L      Chloride 109 mmol/L      CO2 25 mmol/L      ANION GAP 5 mmol/L      BUN 19 mg/dL      Creatinine 0.95 mg/dL      Glucose 111 mg/dL      Calcium 8.5 mg/dL      AST 12 U/L      ALT 11 U/L      Alkaline Phosphatase 75 U/L      Total Protein 6.4 g/dL      Albumin 3.7 g/dL      Total Bilirubin 0.34 mg/dL      eGFR 83 ml/min/1.73sq m     Narrative:      National Kidney Disease Foundation guidelines for Chronic Kidney Disease (CKD):     Stage 1 with normal or high GFR (GFR > 90 mL/min/1.73 square meters)    Stage 2  Mild CKD (GFR = 60-89 mL/min/1.73 square meters)    Stage 3A Moderate CKD (GFR = 45-59 mL/min/1.73 square meters)    Stage 3B Moderate CKD (GFR = 30-44 mL/min/1.73 square meters)    Stage 4 Severe CKD (GFR = 15-29 mL/min/1.73 square meters)    Stage 5 End Stage CKD (GFR <15 mL/min/1.73 square meters)  Note: GFR calculation is accurate only with a steady state creatinine    CBC and differential [[id number]]  (Abnormal) Collected: 06/26/25 0950    Lab Status: Final result Specimen: Blood from Arm, Right Updated: 06/26/25 1011     WBC 8.21 Thousand/uL      RBC 3.85 Million/uL      Hemoglobin 12.0 g/dL      Hematocrit 35.6 %      MCV 93 fL      MCH 31.2 pg      MCHC 33.7 g/dL      RDW 13.7 %      MPV 8.3 fL      Platelets 237 Thousands/uL      nRBC 0 /100 WBCs      Segmented % 63 %      Immature Grans % 0 %      Lymphocytes % 24 %      Monocytes % 8 %      Eosinophils Relative 4 %      Basophils Relative 1 %      Absolute Neutrophils 5.22 Thousands/µL      Absolute Immature Grans 0.03 Thousand/uL      Absolute Lymphocytes 1.99 Thousands/µL      Absolute Monocytes 0.62 Thousand/µL      Eosinophils Absolute 0.29 Thousand/µL      Basophils Absolute 0.06 Thousands/µL     HS Troponin 0hr (reflex protocol) [[id number]]  (Normal) Collected: 06/26/25 0921    Lab Status: Final result Specimen: Blood from Arm, Right Updated: 06/26/25 0952     hs TnI 0hr 7 ng/L             XR chest 2 views   ED Interpretation by Earline Gallo PA-C (06/26 1120)   No acute cardiopulmonary disease          ECG 12 Lead Documentation Only    Date/Time: 6/26/2025 9:36 AM    Performed by: Earline Gallo PA-C  Authorized by: Earline Gallo PA-C    Comments:      Sinus bradycardia at 49.  No acute ST-T changes.      ED Medication and Procedure Management   Prior to Admission Medications   Prescriptions Last Dose Informant Patient Reported? Taking?   MULTIPLE VITAMIN PO   Yes No   Sig: Take by mouth   aspirin 81 MG tablet 6/26/2025 Morning   Yes Yes   Sig: Take 81 mg by mouth      Facility-Administered Medications: None     Patient's Medications   Discharge Prescriptions    No medications on file     No discharge procedures on file.  ED SEPSIS DOCUMENTATION   Time reflects when diagnosis was documented in both MDM as applicable and the Disposition within this note       Time User Action Codes Description Comment    6/26/2025  1:14 PM Earline Gallo [R00.1] Bradycardia     6/26/2025  1:14 PM Earline Gallo [R42] Lightheadedness     6/26/2025  1:14 PM Earline Gallo [R07.9] Chest pain                    [1]   Past Medical History:  Diagnosis Date    CAD (coronary artery disease)    [2]   Past Surgical History:  Procedure Laterality Date    CHOLECYSTECTOMY     [3] No family history on file.  [4]   Social History  Tobacco Use    Smoking status: Some Days     Types: Cigarettes    Smokeless tobacco: Never   Substance Use Topics    Alcohol use: Yes     Comment: social    Drug use: No        Earline Gallo PA-C  06/26/25 2568

## 2025-06-26 NOTE — ASSESSMENT & PLAN NOTE
Patient came to the hospital with acute onset of chest pain today morning.  Patient reported that he noticed the pain when he woke up.    Chest pain was 6 located on his left side with some radiation to the left upper extremities.  Lasted for about 45 minutes and currently there is no pain.  Gives associated dizziness and also nausea  Patient follows up with cardiologist in her up and recent workup showed increased calcium score  Previous stress echocardiogram from 10/23 reviewed.  Ejection fraction 61%.  Mild left ventricular dilatation.  Normal LV diastolic function.  Small LV diverticulum at the apex no significant valvular abnormalities.  At peak stress the left ventricular ejection fraction increased normally and no new wall motion abnormalities seen  Troponin negative x 3.  EKG sinus bradycardia but no ST or T wave changes concerning for ischemia.  Cardiology already consulted by emergency room.  Obtain echocardiogram.  Will defer to cardiology for further workup.  Monitor on telemetry.  Patient reported that he stopped taking aspirin as recommended by his doctors in the past.  Will continue with aspirin 81 mg  Patient with an tolerance to statin per records

## 2025-06-27 ENCOUNTER — APPOINTMENT (OUTPATIENT)
Dept: NON INVASIVE DIAGNOSTICS | Facility: HOSPITAL | Age: 65
End: 2025-06-27
Attending: STUDENT IN AN ORGANIZED HEALTH CARE EDUCATION/TRAINING PROGRAM
Payer: MEDICARE

## 2025-06-27 VITALS
HEIGHT: 72 IN | WEIGHT: 250 LBS | DIASTOLIC BLOOD PRESSURE: 59 MMHG | OXYGEN SATURATION: 97 % | RESPIRATION RATE: 20 BRPM | TEMPERATURE: 98.5 F | SYSTOLIC BLOOD PRESSURE: 137 MMHG | BODY MASS INDEX: 33.86 KG/M2 | HEART RATE: 45 BPM

## 2025-06-27 LAB
ANION GAP SERPL CALCULATED.3IONS-SCNC: 9 MMOL/L (ref 4–13)
BASOPHILS # BLD AUTO: 0.05 THOUSANDS/ÂΜL (ref 0–0.1)
BASOPHILS NFR BLD AUTO: 1 % (ref 0–1)
BUN SERPL-MCNC: 22 MG/DL (ref 5–25)
CALCIUM SERPL-MCNC: 8.8 MG/DL (ref 8.4–10.2)
CHEST PAIN STATEMENT: NORMAL
CHEST PAIN STATEMENT: NORMAL
CHLORIDE SERPL-SCNC: 106 MMOL/L (ref 96–108)
CHOLEST SERPL-MCNC: 151 MG/DL (ref ?–200)
CO2 SERPL-SCNC: 24 MMOL/L (ref 21–32)
CREAT SERPL-MCNC: 0.99 MG/DL (ref 0.6–1.3)
EOSINOPHIL # BLD AUTO: 0.62 THOUSAND/ÂΜL (ref 0–0.61)
EOSINOPHIL NFR BLD AUTO: 6 % (ref 0–6)
ERYTHROCYTE [DISTWIDTH] IN BLOOD BY AUTOMATED COUNT: 13.6 % (ref 11.6–15.1)
GFR SERPL CREATININE-BSD FRML MDRD: 79 ML/MIN/1.73SQ M
GLUCOSE P FAST SERPL-MCNC: 90 MG/DL (ref 65–99)
GLUCOSE SERPL-MCNC: 90 MG/DL (ref 65–140)
HCT VFR BLD AUTO: 34.9 % (ref 36.5–49.3)
HDLC SERPL-MCNC: 39 MG/DL
HGB BLD-MCNC: 11.9 G/DL (ref 12–17)
IMM GRANULOCYTES # BLD AUTO: 0.03 THOUSAND/UL (ref 0–0.2)
IMM GRANULOCYTES NFR BLD AUTO: 0 % (ref 0–2)
LDLC SERPL CALC-MCNC: 89 MG/DL (ref 0–100)
LYMPHOCYTES # BLD AUTO: 3.06 THOUSANDS/ÂΜL (ref 0.6–4.47)
LYMPHOCYTES NFR BLD AUTO: 32 % (ref 14–44)
MAX DIASTOLIC BP: 64 MMHG
MAX DIASTOLIC BP: 64 MMHG
MAX HR PERCENT: 82 %
MAX HR: 127 BPM
MAX PREDICTED HEART RATE: 154 BPM
MAX PREDICTED HEART RATE: 154 BPM
MCH RBC QN AUTO: 31.5 PG (ref 26.8–34.3)
MCHC RBC AUTO-ENTMCNC: 34.1 G/DL (ref 31.4–37.4)
MCV RBC AUTO: 92 FL (ref 82–98)
MONOCYTES # BLD AUTO: 0.8 THOUSAND/ÂΜL (ref 0.17–1.22)
MONOCYTES NFR BLD AUTO: 8 % (ref 4–12)
NEUTROPHILS # BLD AUTO: 5.07 THOUSANDS/ÂΜL (ref 1.85–7.62)
NEUTS SEG NFR BLD AUTO: 53 % (ref 43–75)
NRBC BLD AUTO-RTO: 0 /100 WBCS
PLATELET # BLD AUTO: 222 THOUSANDS/UL (ref 149–390)
PMV BLD AUTO: 8.2 FL (ref 8.9–12.7)
POTASSIUM SERPL-SCNC: 3.9 MMOL/L (ref 3.5–5.3)
PROTOCOL NAME: NORMAL
PROTOCOL NAME: NORMAL
RATE PRESSURE PRODUCT: NORMAL
RBC # BLD AUTO: 3.78 MILLION/UL (ref 3.88–5.62)
SL CV LV EF: 60
SL CV STRESS RECOVERY BP: NORMAL MMHG
SL CV STRESS RECOVERY HR: 71 BPM
SL CV STRESS RECOVERY O2 SAT: 99 %
SL CV STRESS STAGE REACHED: 3
SODIUM SERPL-SCNC: 139 MMOL/L (ref 135–147)
STRESS ANGINA INDEX: 0
STRESS BASELINE BP: NORMAL MMHG
STRESS BASELINE HR: 46 BPM
STRESS O2 SAT REST: 99 %
STRESS PEAK HR: 127 BPM
STRESS POST ESTIMATED WORKLOAD: 7 METS
STRESS POST EXERCISE DUR MIN: 8 MIN
STRESS POST EXERCISE DUR SEC: 0 SEC
STRESS POST O2 SAT PEAK: 99 %
STRESS POST PEAK BP: 132 MMHG
STRESS POST PEAK HR: 127 BPM
STRESS POST PEAK HR: 127 BPM
STRESS POST PEAK SYSTOLIC BP: 200 MMHG
STRESS POST PEAK SYSTOLIC BP: 200 MMHG
TARGET HR FORMULA: NORMAL
TARGET HR FORMULA: NORMAL
TRIGL SERPL-MCNC: 114 MG/DL (ref ?–150)
WBC # BLD AUTO: 9.63 THOUSAND/UL (ref 4.31–10.16)

## 2025-06-27 PROCEDURE — 36415 COLL VENOUS BLD VENIPUNCTURE: CPT | Performed by: FAMILY MEDICINE

## 2025-06-27 PROCEDURE — 93350 STRESS TTE ONLY: CPT | Performed by: INTERNAL MEDICINE

## 2025-06-27 PROCEDURE — 85025 COMPLETE CBC W/AUTO DIFF WBC: CPT

## 2025-06-27 PROCEDURE — 99214 OFFICE O/P EST MOD 30 MIN: CPT | Performed by: INTERNAL MEDICINE

## 2025-06-27 PROCEDURE — 99239 HOSP IP/OBS DSCHRG MGMT >30: CPT | Performed by: NURSE PRACTITIONER

## 2025-06-27 PROCEDURE — 80048 BASIC METABOLIC PNL TOTAL CA: CPT

## 2025-06-27 PROCEDURE — 80061 LIPID PANEL: CPT | Performed by: FAMILY MEDICINE

## 2025-06-27 PROCEDURE — 93350 STRESS TTE ONLY: CPT

## 2025-06-27 RX ADMIN — ENOXAPARIN SODIUM 40 MG: 40 INJECTION SUBCUTANEOUS at 09:04

## 2025-06-27 RX ADMIN — B-COMPLEX W/ C & FOLIC ACID TAB 1 TABLET: TAB at 09:04

## 2025-06-27 RX ADMIN — ASPIRIN 81 MG: 81 TABLET, COATED ORAL at 09:04

## 2025-06-27 NOTE — ASSESSMENT & PLAN NOTE
Ector Crouch is a 65 y.o. male with an unremarkable PMHx who presents with chest pain. Pt reports that he woke up around 6 am with crushing pressure and pain in his chest that radiated down his left arm. Pt says he was nauseous and dizzy at the time. The pain resolved after around 45 minutes.   Troponins are negative. CBC showed lowered RBC and hematocrit. Normal TSH. Chest x-ray demonstrated no acute abnormalities. EKG showed sinus bradycardia and no ischemic changes. Echo demonstrated EF 55% (previous study in 2023 was EF 61%) and mild diffuse valvular disease.     Plan:  Continue aspirin 81 mg and enoxaparin 40 mg  Exercise stress test with echo scheduled for 11:00 today to assess for possible ischemia  Monitor telemetry

## 2025-06-27 NOTE — DISCHARGE SUMMARY
Discharge Summary - Hospitalist   Name: Ector Crouch 65 y.o. male I MRN: [id number]  Unit/Bed#: DIS08 I Date of Admission: 6/26/2025   Date of Service: 6/27/2025 I Hospital Day: 0     Assessment & Plan  Chest pain  Patient presented with chest pain.  Woke up at 6 AM day of admission with crushing pressure and pain in his left chest radiating down his left arm.  Associated nausea and dizziness.  Pain resolved after about 45 minutes.  Troponins negative.  Normal TSH.  Normal chest x-ray.  EKG with sinus bradycardia, no ischemic changes.  Echocardiogram showed EF of 55%, mild diffuse valvular disease.  Patient was seen by cardiology, recommended exercise stress test which was negative for ischemia.  Patient is stable for discharge home today, discussed with cardiology. If patient were to have recurrent symptoms, would consider Lexiscan NM stress (preferable) vs cardiac CTA (high coronary calcium but still may be acceptable depending if newer CT scanner is available).   Obesity  Lifestyle modification  Tobacco abuse  Patient with history of tobacco abuse.  He reported that he started smoking at the age of 12.  Currently down to 2 to 3 cigarettes a day  Declined nicotine patch.  Encourage smoking cessation  Obstructive sleep apnea  Documented history of obstructive sleep apnea.    Not on CPAP or BiPAP     Medical Problems       Resolved Problems  Date Reviewed: 5/31/2018   None       Discharging Physician / Practitioner: KAMARI Ellis  PCP: No primary care provider on file.  Admission Date:   Admission Orders (From admission, onward)       Ordered        06/26/25 1337  Place in Observation  Once                          Discharge Date: 06/27/25    Next Steps for Physician/AP Assuming Care:  Follow-up with PCP/establish care with PCP  Follow-up with primary cardiologist Dr. Donald Montague    Test Results Pending at Discharge (will require follow up):  none    Medication Changes for Discharge & Rationale:     See  after visit summary for reconciled discharge medications provided to patient and/or family.     Consultations During Hospital Stay:  Cardiology    Procedures Performed:   Exercise stress test negative for ischemia    Significant Findings / Test Results:   Troponin x 3 negative  Echocardiogram shows EF 55%, left atrium mildly dilated, mild regurgitation of aortic valve.  Mild mitral valve calcification.  Mild regurgitation of pulmonic valve.  Lipid panel shows total cholesterol 151, triglycerides 114, HDL 39, LDL 89  CMP/CBC unremarkable  TSH 1.908  Hemoglobin A1c 5.3    Incidental Findings:   None      Hospital Course:   Ector Crouch is a 65 y.o. male patient who originally presented to the hospital on 6/26/2025 due to chest pain.  Negative troponins and nonischemic EKG.  Was seen by cardiology.  Underwent echocardiogram without any significant findings and subsequent exercise stress test which was negative for ischemia.  If patient were to have recurrent symptoms, could consider additional workup including Lexiscan or cardiac CT as outpatient.    Stable for discharge home today.  Discussed with cardiology.      Please see above list of diagnoses and related plan for additional information.     Discharge Day Visit / Exam:   Subjective:  No complaints. No further chest pain.   Vitals: Blood Pressure: 137/59 (06/27/25 0825)  Pulse: (!) 45 (06/27/25 0825)  Temperature: 98.5 °F (36.9 °C) (06/27/25 0825)  Temp Source: Oral (06/27/25 0825)  Respirations: 20 (06/27/25 0825)  Height: 6' (182.9 cm) (06/26/25 1630)  Weight - Scale: 113 kg (250 lb) (06/26/25 1630)  SpO2: 97 % (06/27/25 0825)  Physical Exam  Vitals and nursing note reviewed.   Constitutional:       General: He is not in acute distress.    Cardiovascular:      Rate and Rhythm: Normal rate.   Pulmonary:      Breath sounds: Normal breath sounds.   Abdominal:      Tenderness: There is no abdominal tenderness.     Musculoskeletal:         General: No swelling.      Skin:     General: Skin is warm.     Neurological:      Mental Status: He is alert and oriented to person, place, and time. Mental status is at baseline.     Psychiatric:         Mood and Affect: Mood normal.          Discussion with Family: Patient declined call to .     Discharge instructions/Information to patient and family:   See after visit summary for information provided to patient and family.      Provisions for Follow-Up Care:  See after visit summary for information related to follow-up care and any pertinent home health orders.      Mobility at time of Discharge:      HLM Goal achieved. Continue to encourage appropriate mobility.     Disposition:   Home    Planned Readmission: no    Administrative Statements   Discharge Statement:  I have spent a total time of 45 minutes in caring for this patient on the day of the visit/encounter.    **Please Note: This note may have been constructed using a voice recognition system**

## 2025-06-27 NOTE — ASSESSMENT & PLAN NOTE
Patient presented with chest pain.  Woke up at 6 AM day of admission with crushing pressure and pain in his left chest radiating down his left arm.  Associated nausea and dizziness.  Pain resolved after about 45 minutes.  Troponins negative.  Normal TSH.  Normal chest x-ray.  EKG with sinus bradycardia, no ischemic changes.  Echocardiogram showed EF of 55%, mild diffuse valvular disease.  Patient was seen by cardiology, recommended exercise stress test which was negative for ischemia.  Patient is stable for discharge home today, discussed with cardiology. If patient were to have recurrent symptoms, would consider Lexiscan NM stress (preferable) vs cardiac CTA (high coronary calcium but still may be acceptable depending if newer CT scanner is available).

## 2025-06-27 NOTE — PROGRESS NOTES
Progress Note - Cardiology   Name: Ector Crouch 65 y.o. male I MRN: [id number]  Unit/Bed#: DIS08 I Date of Admission: 6/26/2025   Date of Service: 6/27/2025 I Hospital Day: 0    Assessment & Plan  Chest pain  Ector Crouch is a 65 y.o. male with an unremarkable PMHx who presents with chest pain. Pt reports that he woke up around 6 am with crushing pressure and pain in his chest that radiated down his left arm. Pt says he was nauseous and dizzy at the time. The pain resolved after around 45 minutes.   Troponins are negative. CBC showed lowered RBC and hematocrit. Normal TSH. Chest x-ray demonstrated no acute abnormalities. EKG showed sinus bradycardia and no ischemic changes. Echo demonstrated EF 55% (previous study in 2023 was EF 61%) and mild diffuse valvular disease.     Plan:  Continue aspirin 81 mg and enoxaparin 40 mg  Exercise stress test with echo scheduled for 11:00 today to assess for possible ischemia  Monitor telemetry  Obesity  Encourage lifestyle changes.  Hyperlipidemia  Pt not currently on statin medication.   Tobacco abuse  Pt reports that he started smoking at he age of 12. Encourage smoking cessation.   Obstructive sleep apnea  Documented history of TAIWO. Pt is not currently on CPAP or BIPAP.     Subjective   Chief Complaint: Chest pain    Pt was evaluated this morning at bedside. He denies chest pain, SOB, palpitations, dizziness, or edema. Pt has remained in sinus bradycardia overnight but denies symptoms from it. Stress test with echo is scheduled for this morning at 11.     Objective :  Temp:  [97.8 °F (36.6 °C)-98.5 °F (36.9 °C)] 98.5 °F (36.9 °C)  HR:  [40-49] 45  BP: (102-173)/(51-78) 137/59  Resp:  [13-21] 20  SpO2:  [97 %-100 %] 97 %  O2 Device: None (Room air)  Orthostatic Blood Pressures      Flowsheet Row Most Recent Value   Blood Pressure 137/59 filed at 06/27/2025 0825   Patient Position - Orthostatic VS Lying filed at 06/27/2025 0825          First Weight: Weight - Scale: 113 kg  "(250 lb) (06/26/25 0907)  Vitals:    06/26/25 0907 06/26/25 1630   Weight: 113 kg (250 lb) 113 kg (250 lb)     Physical Exam  Vitals reviewed.   Constitutional:       General: He is not in acute distress.    Cardiovascular:      Rate and Rhythm: Regular rhythm. Bradycardia present.      Pulses: Normal pulses.           Radial pulses are 2+ on the right side and 2+ on the left side.        Dorsalis pedis pulses are 2+ on the right side and 2+ on the left side.      Heart sounds: Normal heart sounds. No murmur heard.     No friction rub. No gallop.   Pulmonary:      Effort: Pulmonary effort is normal.      Breath sounds: Normal breath sounds.     Musculoskeletal:      Right lower leg: No edema.      Left lower leg: No edema.     Skin:     General: Skin is warm and dry.      Capillary Refill: Capillary refill takes less than 2 seconds.     Neurological:      Mental Status: He is alert and oriented to person, place, and time.         Lab Results: I have reviewed the following results:Troponin,BNP:  .     06/26/25  0921 06/26/25  1141   HSTNI0 7  --    HSTNI2  --  5    , Lipid Profile:   Results from last 7 days   Lab Units 06/27/25  0531   HDL mg/dL 39*   LDL CALC mg/dL 89   TRIGLYCERIDES mg/dL 114     Results from last 7 days   Lab Units 06/27/25  0531 06/26/25  0950   WBC Thousand/uL 9.63 8.21   HEMOGLOBIN g/dL 11.9* 12.0   HEMATOCRIT % 34.9* 35.6*   PLATELETS Thousands/uL 222 237     Results from last 7 days   Lab Units 06/27/25  0531 06/26/25  1010   POTASSIUM mmol/L 3.9 4.0   CHLORIDE mmol/L 106 109*   CO2 mmol/L 24 25   BUN mg/dL 22 19   CREATININE mg/dL 0.99 0.95   CALCIUM mg/dL 8.8 8.5         Lab Results   Component Value Date    HGBA1C 5.3 06/26/2025     No results found for: \"CKTOTAL\", \"CKMB\", \"CKMBINDEX\", \"TROPONINI\"    Imaging Results Review: No pertinent imaging studies reviewed.  Other Study Results Review: EKG was reviewed.     VTE Pharmacologic Prophylaxis: VTE covered by:  enoxaparin, Subcutaneous, 40 " mg at 06/27/25 0904     Mary Kraft  MS-3

## 2025-07-01 LAB
CHEST PAIN STATEMENT: NORMAL
MAX DIASTOLIC BP: 64 MMHG
MAX PREDICTED HEART RATE: 154 BPM
PROTOCOL NAME: NORMAL
STRESS POST EXERCISE DUR MIN: 8 MIN
STRESS POST EXERCISE DUR SEC: 0 SEC
STRESS POST PEAK HR: 127 BPM
STRESS POST PEAK SYSTOLIC BP: 200 MMHG
TARGET HR FORMULA: NORMAL